# Patient Record
Sex: FEMALE | Race: WHITE | NOT HISPANIC OR LATINO | Employment: FULL TIME | URBAN - NONMETROPOLITAN AREA
[De-identification: names, ages, dates, MRNs, and addresses within clinical notes are randomized per-mention and may not be internally consistent; named-entity substitution may affect disease eponyms.]

---

## 2023-11-14 ENCOUNTER — OFFICE VISIT (OUTPATIENT)
Dept: PRIMARY CARE | Facility: CLINIC | Age: 23
End: 2023-11-14
Payer: COMMERCIAL

## 2023-11-14 VITALS
HEART RATE: 87 BPM | HEIGHT: 66 IN | WEIGHT: 144.2 LBS | BODY MASS INDEX: 23.18 KG/M2 | SYSTOLIC BLOOD PRESSURE: 112 MMHG | OXYGEN SATURATION: 98 % | DIASTOLIC BLOOD PRESSURE: 74 MMHG

## 2023-11-14 DIAGNOSIS — J01.10 ACUTE NON-RECURRENT FRONTAL SINUSITIS: Primary | ICD-10-CM

## 2023-11-14 PROBLEM — L23.9 ALLERGIC CONTACT DERMATITIS: Status: ACTIVE | Noted: 2023-11-14

## 2023-11-14 PROBLEM — J20.9 ACUTE BRONCHITIS: Status: RESOLVED | Noted: 2023-11-14 | Resolved: 2023-11-14

## 2023-11-14 PROBLEM — G90.A POTS (POSTURAL ORTHOSTATIC TACHYCARDIA SYNDROME): Status: ACTIVE | Noted: 2023-11-14

## 2023-11-14 PROBLEM — N39.0 RECURRENT UTI: Status: ACTIVE | Noted: 2023-11-14

## 2023-11-14 PROBLEM — R55 NEAR SYNCOPE: Status: ACTIVE | Noted: 2023-11-14

## 2023-11-14 PROBLEM — R00.2 PALPITATIONS: Status: ACTIVE | Noted: 2023-11-14

## 2023-11-14 PROBLEM — R21 RASH: Status: ACTIVE | Noted: 2023-11-14

## 2023-11-14 PROBLEM — U07.1 COVID-19: Status: RESOLVED | Noted: 2023-11-14 | Resolved: 2023-11-14

## 2023-11-14 PROBLEM — R55 SYNCOPE: Status: ACTIVE | Noted: 2023-11-14

## 2023-11-14 PROBLEM — R42 POSTURAL DIZZINESS: Status: RESOLVED | Noted: 2023-11-14 | Resolved: 2023-11-14

## 2023-11-14 PROBLEM — Q82.6 SACRAL DIMPLE: Status: RESOLVED | Noted: 2023-11-14 | Resolved: 2023-11-14

## 2023-11-14 PROCEDURE — 99213 OFFICE O/P EST LOW 20 MIN: CPT | Performed by: REGISTERED NURSE

## 2023-11-14 PROCEDURE — 1036F TOBACCO NON-USER: CPT | Performed by: REGISTERED NURSE

## 2023-11-14 RX ORDER — AMOXICILLIN AND CLAVULANATE POTASSIUM 875; 125 MG/1; MG/1
875 TABLET, FILM COATED ORAL 2 TIMES DAILY
Qty: 20 TABLET | Refills: 0 | Status: SHIPPED | OUTPATIENT
Start: 2023-11-14 | End: 2023-11-24

## 2023-11-14 RX ORDER — DIPHENHYDRAMINE HCL 25 MG
TABLET ORAL EVERY 6 HOURS
COMMUNITY
Start: 2014-10-03 | End: 2023-11-14 | Stop reason: WASHOUT

## 2023-11-14 RX ORDER — BETAMETHASONE DIPROPIONATE 0.5 MG/G
CREAM TOPICAL EVERY 12 HOURS
COMMUNITY
Start: 2014-10-03 | End: 2023-11-14 | Stop reason: WASHOUT

## 2023-12-14 ENCOUNTER — OFFICE VISIT (OUTPATIENT)
Dept: PRIMARY CARE | Facility: CLINIC | Age: 23
End: 2023-12-14
Payer: COMMERCIAL

## 2023-12-14 VITALS
DIASTOLIC BLOOD PRESSURE: 75 MMHG | HEIGHT: 66 IN | HEART RATE: 101 BPM | WEIGHT: 141 LBS | SYSTOLIC BLOOD PRESSURE: 124 MMHG | TEMPERATURE: 98.4 F | BODY MASS INDEX: 22.66 KG/M2

## 2023-12-14 DIAGNOSIS — J01.90 ACUTE SINUSITIS, RECURRENCE NOT SPECIFIED, UNSPECIFIED LOCATION: Primary | ICD-10-CM

## 2023-12-14 PROCEDURE — 1036F TOBACCO NON-USER: CPT | Performed by: FAMILY MEDICINE

## 2023-12-14 PROCEDURE — 99214 OFFICE O/P EST MOD 30 MIN: CPT | Performed by: FAMILY MEDICINE

## 2023-12-14 RX ORDER — DOXYCYCLINE 100 MG/1
100 CAPSULE ORAL 2 TIMES DAILY
Qty: 20 CAPSULE | Refills: 0 | Status: SHIPPED | OUTPATIENT
Start: 2023-12-14 | End: 2023-12-24

## 2023-12-14 RX ORDER — PREDNISONE 20 MG/1
40 TABLET ORAL DAILY
Qty: 10 TABLET | Refills: 0 | Status: SHIPPED | OUTPATIENT
Start: 2023-12-14 | End: 2023-12-19

## 2023-12-14 NOTE — PROGRESS NOTES
"Jaimee Emerson is a 23 y.o. female who presents for Sick Visit (Saw Kierra on 11/14 for URI, finished atb, took mucinex and Sudafed, then started allergy medications and she's had no relief; at home covid tests have been negative)    Sinus pressure and congestion. Green rhinorrhea. Cough, intermittently productive. No ear pressure/muffled hearing. No PND. No fevers or chills. No sore throat. No vomiting or diarrhea. Completed augmentin for this about a month ago with no improvement whatsoever.      All other systems reviewed and negative for complaint unless stated above.    Objective   /75 (BP Location: Left arm, Patient Position: Sitting, BP Cuff Size: Adult)   Pulse 101   Temp 36.9 °C (98.4 °F)   Ht 1.676 m (5' 6\")   Wt 64 kg (141 lb)   BMI 22.76 kg/m²     Gen: No acute distress, alert and oriented x3, pleasant   HEENT: moist mucous membranes, b/l external auditory canals are clear of debris, TMs within normal limits, no oropharyngeal lesions, eomi, perrla   Neck: thyroid within normal limits, no lymphadenopathy   CV: RRR, normal S1/S2, no murmur   Resp: Clear to auscultation bilaterally, no wheezes or rhonchi appreciated  Abd: soft, nontender, non-distended, no guarding/rigidity, bowel sounds present  Extr: no edema, no calf tenderness  Derm: Skin is warm and dry, no rashes appreciated  Psych: mood is good, affect is congruent, good hygiene, normal speech and eye contact  Neuro: cranial nerves grossly intact, normal gait    Assessment/Plan     #Acute sinusitis  No improvement on augmentin  Rx sent doxycycline, discussed probiotics  Fluids, rest, tylenol prn  Rx sent prednisone  "